# Patient Record
Sex: FEMALE | URBAN - METROPOLITAN AREA
[De-identification: names, ages, dates, MRNs, and addresses within clinical notes are randomized per-mention and may not be internally consistent; named-entity substitution may affect disease eponyms.]

---

## 2020-10-12 ENCOUNTER — OFFICE VISIT (OUTPATIENT)
Dept: URBAN - METROPOLITAN AREA CLINIC 41 | Facility: CLINIC | Age: 45
End: 2020-10-12
Payer: COMMERCIAL

## 2020-10-12 DIAGNOSIS — H25.13 AGE-RELATED NUCLEAR CATARACT, BILATERAL: ICD-10-CM

## 2020-10-12 PROCEDURE — 92012 INTRM OPH EXAM EST PATIENT: CPT | Performed by: OPHTHALMOLOGY

## 2020-10-12 PROCEDURE — 92134 CPTRZ OPH DX IMG PST SGM RTA: CPT | Performed by: OPHTHALMOLOGY

## 2020-10-12 ASSESSMENT — INTRAOCULAR PRESSURE
OS: 13
OD: 19

## 2020-10-12 NOTE — IMPRESSION/PLAN
Impression: Age-related nuclear cataract, bilateral: H25.13. Plan: Exam demonstrates a mild NS cataract. Recommend observation for now.

## 2020-10-12 NOTE — IMPRESSION/PLAN
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 Left. Status: Symptomatic. Wet AMD vs Atypical 
- h/o immunosuppression for kidney transplant (IgA nephritis, Santillan's disease) s/p Avastin last 03/30/20 Plan: Patient notes distortion OS ~ 1 wk ago, that seems to have improved. Examination and OCT confirm the presence of a PED and improved SRF at 7 months interval after Avastin injection x 1. FA (5/1/2020) demonstrates staining with minimal leakage. The options of observation with close follow up (PRN) or treatment today with extended follow up (treat and extend) were discussed with the patient. Recommend continued observation today. Warning symptoms discussed. Follow up in 3 mo for re-eval, OCT OU, poss Avastin; sooner if any issues arise.

## 2021-01-25 ENCOUNTER — OFFICE VISIT (OUTPATIENT)
Dept: URBAN - METROPOLITAN AREA CLINIC 41 | Facility: CLINIC | Age: 46
End: 2021-01-25
Payer: COMMERCIAL

## 2021-01-25 PROCEDURE — 92134 CPTRZ OPH DX IMG PST SGM RTA: CPT | Performed by: OPHTHALMOLOGY

## 2021-01-25 PROCEDURE — 92012 INTRM OPH EXAM EST PATIENT: CPT | Performed by: OPHTHALMOLOGY

## 2021-01-25 ASSESSMENT — INTRAOCULAR PRESSURE
OD: 11
OS: 9

## 2021-01-25 NOTE — IMPRESSION/PLAN
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 Left. Status: Symptomatic. Wet AMD vs Atypical 
- h/o immunosuppression for kidney transplant (IgA nephritis, Santillan's disease) s/p Avastin last 03/30/20 Plan: Patient notes distortion OS ~ 1 wk ago, that seems to have improved. Examination and OCT confirm the presence of a PED and improved SRF at 7 months interval after Avastin injection x 1. FA (5/1/2020) demonstrates staining with minimal leakage. The options of observation with close follow up (PRN) or treatment today with extended follow up (treat and extend) were discussed with the patient. Recommend continued observation today. Warning symptoms discussed. Follow up in 6 mo for re-eval, OCT OU, poss Avastin; sooner if any issues arise.

## 2021-07-28 ENCOUNTER — OFFICE VISIT (OUTPATIENT)
Dept: URBAN - METROPOLITAN AREA CLINIC 41 | Facility: CLINIC | Age: 46
End: 2021-07-28
Payer: COMMERCIAL

## 2021-07-28 DIAGNOSIS — H35.3221 EXDTVE AGE-REL MCLR DEGN, LEFT EYE, WITH ACTV CHRDL NEOVAS: Primary | ICD-10-CM

## 2021-07-28 PROCEDURE — 92134 CPTRZ OPH DX IMG PST SGM RTA: CPT | Performed by: OPHTHALMOLOGY

## 2021-07-28 PROCEDURE — 99214 OFFICE O/P EST MOD 30 MIN: CPT | Performed by: OPHTHALMOLOGY

## 2021-07-28 ASSESSMENT — INTRAOCULAR PRESSURE
OD: 14
OS: 13

## 2021-07-28 NOTE — IMPRESSION/PLAN
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 Left. Status: Symptomatic. Wet AMD vs Atypical 
- h/o immunosuppression for kidney transplant (IgA nephritis, Santillan's disease) s/p Avastin last 03/30/20 Plan: Patient notes stable distortion. Examination and OCT confirm the presence of a PED and improved SRF at 7 months interval after Avastin injection x 1. FA (5/1/2020) demonstrates staining with minimal leakage. The options of observation with close follow up (PRN) or treatment today with extended follow up (treat and extend) were discussed with the patient. Recommend continued observation today. Warning symptoms discussed. Follow up in 6 mo for re-eval, OCT OU, poss Avastin; sooner if any issues arise.

## 2022-01-26 ENCOUNTER — OFFICE VISIT (OUTPATIENT)
Dept: URBAN - METROPOLITAN AREA CLINIC 41 | Facility: CLINIC | Age: 47
End: 2022-01-26
Payer: COMMERCIAL

## 2022-01-26 PROCEDURE — 92014 COMPRE OPH EXAM EST PT 1/>: CPT | Performed by: OPHTHALMOLOGY

## 2022-01-26 PROCEDURE — 92134 CPTRZ OPH DX IMG PST SGM RTA: CPT | Performed by: OPHTHALMOLOGY

## 2022-01-26 ASSESSMENT — INTRAOCULAR PRESSURE
OD: 14
OS: 13

## 2022-01-26 NOTE — IMPRESSION/PLAN
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 Left. Status: Symptomatic. Wet AMD vs Atypical 
- h/o immunosuppression for kidney transplant (IgA nephritis, Santillan's disease), on Pred 5 mg
s/p Avastin last 03/30/20 Plan: Patient notes stable vision since last exam.  Examination and OCT confirm the presence of a PED and improved SRF after Avastin injection x 1. FA (5/1/2020) demonstrates staining with minimal leakage. The options of observation with close follow up (PRN) or treatment today with extended follow up (treat and extend) were discussed with the patient. Recommend continued observation today. Warning symptoms discussed. Follow up in 6 mo for re-eval, OCT OU, poss Avastin; sooner if any issues arise.

## 2022-07-27 ENCOUNTER — OFFICE VISIT (OUTPATIENT)
Dept: URBAN - METROPOLITAN AREA CLINIC 41 | Facility: CLINIC | Age: 47
End: 2022-07-27
Payer: COMMERCIAL

## 2022-07-27 DIAGNOSIS — H25.13 AGE-RELATED NUCLEAR CATARACT, BILATERAL: ICD-10-CM

## 2022-07-27 DIAGNOSIS — H35.3221 EXDTVE AGE-REL MCLR DEGN, LEFT EYE, WITH ACTV CHRDL NEOVAS: Primary | ICD-10-CM

## 2022-07-27 PROCEDURE — 99214 OFFICE O/P EST MOD 30 MIN: CPT | Performed by: OPHTHALMOLOGY

## 2022-07-27 PROCEDURE — 92134 CPTRZ OPH DX IMG PST SGM RTA: CPT | Performed by: OPHTHALMOLOGY

## 2022-07-27 ASSESSMENT — INTRAOCULAR PRESSURE
OS: 15
OD: 11

## 2022-07-27 NOTE — IMPRESSION/PLAN
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 Left. Status: Symptomatic. Wet AMD vs Atypical 
- h/o immunosuppression for kidney transplant (IgA nephritis, Santillan's disease), on Pred 5 mg
s/p Avastin last 03/30/20 Plan: Patient notes blurry vision vision since last exam.  Examination and OCT confirm the presence of a PED and improved SRF after Avastin injection x 1. FA (5/1/2020) demonstrates staining with minimal leakage. The options of observation with close follow up (PRN) or treatment today with extended follow up (treat and extend) were discussed with the patient. Recommend continued observation today. Warning symptoms discussed. Follow up in 6 mo for re-eval, OCT OU, poss Avastin; sooner if any issues arise.

## 2023-01-11 ENCOUNTER — OFFICE VISIT (OUTPATIENT)
Dept: URBAN - METROPOLITAN AREA CLINIC 41 | Facility: CLINIC | Age: 48
End: 2023-01-11
Payer: COMMERCIAL

## 2023-01-11 DIAGNOSIS — H25.13 AGE-RELATED NUCLEAR CATARACT, BILATERAL: ICD-10-CM

## 2023-01-11 DIAGNOSIS — H35.3221 EXDTVE AGE-REL MCLR DEGN, LEFT EYE, WITH ACTV CHRDL NEOVAS: Primary | ICD-10-CM

## 2023-01-11 PROCEDURE — 92134 CPTRZ OPH DX IMG PST SGM RTA: CPT | Performed by: OPHTHALMOLOGY

## 2023-01-11 PROCEDURE — 99214 OFFICE O/P EST MOD 30 MIN: CPT | Performed by: OPHTHALMOLOGY

## 2023-01-11 ASSESSMENT — INTRAOCULAR PRESSURE
OS: 21
OD: 15

## 2023-01-11 NOTE — IMPRESSION/PLAN
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 Left. Status: Symptomatic. Wet AMD vs Atypical 
- h/o immunosuppression for kidney transplant (IgA nephritis, Santillan's disease), on Pred 5 mg
s/p Avastin last 03/30/20 Plan: Patient notes stable vision vision since last exam.  Examination and OCT confirm the presence of a PED and improved SRF after Avastin injection x 1. FA (5/1/2020) demonstrates staining with minimal leakage. The options of observation with close follow up (PRN) or treatment today with extended follow up (treat and extend) were discussed with the patient. Recommend continued observation today. Warning symptoms discussed. Follow up in 12 mo for re-eval, OCT OU, poss Avastin; sooner if any issues arise.

## 2024-01-10 ENCOUNTER — OFFICE VISIT (OUTPATIENT)
Dept: URBAN - METROPOLITAN AREA CLINIC 41 | Facility: CLINIC | Age: 49
End: 2024-01-10
Payer: COMMERCIAL

## 2024-01-10 DIAGNOSIS — H25.13 AGE-RELATED NUCLEAR CATARACT, BILATERAL: ICD-10-CM

## 2024-01-10 DIAGNOSIS — H35.3221 EXDTVE AGE-REL MCLR DEGN, LEFT EYE, WITH ACTV CHRDL NEOVAS: Primary | ICD-10-CM

## 2024-01-10 PROCEDURE — 99214 OFFICE O/P EST MOD 30 MIN: CPT | Performed by: OPHTHALMOLOGY

## 2024-01-10 PROCEDURE — 92134 CPTRZ OPH DX IMG PST SGM RTA: CPT | Performed by: OPHTHALMOLOGY

## 2024-01-10 ASSESSMENT — INTRAOCULAR PRESSURE
OD: 18
OS: 19

## 2025-01-31 ENCOUNTER — OFFICE VISIT (OUTPATIENT)
Dept: URBAN - METROPOLITAN AREA CLINIC 41 | Facility: CLINIC | Age: 50
End: 2025-01-31
Payer: COMMERCIAL

## 2025-01-31 DIAGNOSIS — H35.3221 EXUDATIVE AGE-RELATED MACULAR DEGENERATION, LEFT EYE, WITH ACTIVE CHOROIDAL NEOVASCULARIZATION: Primary | ICD-10-CM

## 2025-01-31 DIAGNOSIS — H25.13 AGE-RELATED NUCLEAR CATARACT, BILATERAL: ICD-10-CM

## 2025-01-31 PROCEDURE — 92134 CPTRZ OPH DX IMG PST SGM RTA: CPT | Performed by: OPHTHALMOLOGY

## 2025-01-31 PROCEDURE — 92014 COMPRE OPH EXAM EST PT 1/>: CPT | Performed by: OPHTHALMOLOGY

## 2025-01-31 ASSESSMENT — INTRAOCULAR PRESSURE
OD: 10
OS: 10